# Patient Record
Sex: MALE | Race: WHITE | NOT HISPANIC OR LATINO | URBAN - METROPOLITAN AREA
[De-identification: names, ages, dates, MRNs, and addresses within clinical notes are randomized per-mention and may not be internally consistent; named-entity substitution may affect disease eponyms.]

---

## 2024-10-10 ENCOUNTER — TELEPHONE (OUTPATIENT)
Dept: UROLOGY | Facility: CLINIC | Age: 24
End: 2024-10-10
Payer: COMMERCIAL

## 2024-10-10 DIAGNOSIS — Z00.00 ENCOUNTER FOR MEDICAL EXAMINATION TO ESTABLISH CARE: Primary | ICD-10-CM

## 2024-10-10 NOTE — PROGRESS NOTES
Spoke with patient in regard to urology apt. Informed him a  focused physical exam, early morning T and discussion of treatment options for ED would be the focus of the urology apt today. Discussed the need for a complete history and physical as well as recent routine blood work prior to initiation of ED medications. He would like a referral to internal medicine to establish care and will follow up with urology once established with PCP if needed. Referral placed to internal medicine.

## 2024-10-10 NOTE — TELEPHONE ENCOUNTER
Left a VM informing the patient that I do not prescribe ED medication without patient being established with PCP. Stated that he can keep the apt to discuss ED but no medications would be prescribed at this time. Left call back number.

## 2024-10-15 ENCOUNTER — OFFICE VISIT (OUTPATIENT)
Dept: INTERNAL MEDICINE | Facility: CLINIC | Age: 24
End: 2024-10-15
Payer: COMMERCIAL

## 2024-10-15 ENCOUNTER — LAB VISIT (OUTPATIENT)
Dept: LAB | Facility: HOSPITAL | Age: 24
End: 2024-10-15
Payer: COMMERCIAL

## 2024-10-15 VITALS
RESPIRATION RATE: 15 BRPM | OXYGEN SATURATION: 98 % | DIASTOLIC BLOOD PRESSURE: 80 MMHG | WEIGHT: 148.81 LBS | SYSTOLIC BLOOD PRESSURE: 115 MMHG

## 2024-10-15 DIAGNOSIS — R68.82 LOW LIBIDO: ICD-10-CM

## 2024-10-15 DIAGNOSIS — F41.9 ANXIETY: Primary | ICD-10-CM

## 2024-10-15 DIAGNOSIS — N52.9 ERECTILE DISORDER: ICD-10-CM

## 2024-10-15 DIAGNOSIS — Z00.00 ENCOUNTER FOR MEDICAL EXAMINATION TO ESTABLISH CARE: ICD-10-CM

## 2024-10-15 DIAGNOSIS — Z86.39 HISTORY OF VITAMIN D DEFICIENCY: ICD-10-CM

## 2024-10-15 LAB
BASOPHILS # BLD AUTO: 0.03 K/UL (ref 0–0.2)
BASOPHILS NFR BLD: 0.5 % (ref 0–1.9)
CHOLEST SERPL-MCNC: 157 MG/DL (ref 120–199)
CHOLEST/HDLC SERPL: 2.5 {RATIO} (ref 2–5)
DIFFERENTIAL METHOD BLD: ABNORMAL
EOSINOPHIL # BLD AUTO: 0.3 K/UL (ref 0–0.5)
EOSINOPHIL NFR BLD: 5.3 % (ref 0–8)
ERYTHROCYTE [DISTWIDTH] IN BLOOD BY AUTOMATED COUNT: 11.9 % (ref 11.5–14.5)
HCT VFR BLD AUTO: 46 % (ref 40–54)
HDLC SERPL-MCNC: 62 MG/DL (ref 40–75)
HDLC SERPL: 39.5 % (ref 20–50)
HGB BLD-MCNC: 15.9 G/DL (ref 14–18)
IMM GRANULOCYTES # BLD AUTO: 0.01 K/UL (ref 0–0.04)
IMM GRANULOCYTES NFR BLD AUTO: 0.2 % (ref 0–0.5)
LDLC SERPL CALC-MCNC: 83.6 MG/DL (ref 63–159)
LYMPHOCYTES # BLD AUTO: 1.6 K/UL (ref 1–4.8)
LYMPHOCYTES NFR BLD: 26.1 % (ref 18–48)
MCH RBC QN AUTO: 31.6 PG (ref 27–31)
MCHC RBC AUTO-ENTMCNC: 34.6 G/DL (ref 32–36)
MCV RBC AUTO: 92 FL (ref 82–98)
MONOCYTES # BLD AUTO: 0.5 K/UL (ref 0.3–1)
MONOCYTES NFR BLD: 7.2 % (ref 4–15)
NEUTROPHILS # BLD AUTO: 3.8 K/UL (ref 1.8–7.7)
NEUTROPHILS NFR BLD: 60.7 % (ref 38–73)
NONHDLC SERPL-MCNC: 95 MG/DL
NRBC BLD-RTO: 0 /100 WBC
PLATELET # BLD AUTO: 295 K/UL (ref 150–450)
PMV BLD AUTO: 11.5 FL (ref 9.2–12.9)
RBC # BLD AUTO: 5.03 M/UL (ref 4.6–6.2)
TRIGL SERPL-MCNC: 57 MG/DL (ref 30–150)
TSH SERPL DL<=0.005 MIU/L-ACNC: 1.8 UIU/ML (ref 0.4–4)
WBC # BLD AUTO: 6.24 K/UL (ref 3.9–12.7)

## 2024-10-15 PROCEDURE — 80061 LIPID PANEL: CPT

## 2024-10-15 PROCEDURE — 99999 PR PBB SHADOW E&M-EST. PATIENT-LVL III: CPT | Mod: PBBFAC,,,

## 2024-10-15 PROCEDURE — 84403 ASSAY OF TOTAL TESTOSTERONE: CPT

## 2024-10-15 PROCEDURE — 82040 ASSAY OF SERUM ALBUMIN: CPT

## 2024-10-15 PROCEDURE — 84443 ASSAY THYROID STIM HORMONE: CPT

## 2024-10-15 PROCEDURE — 85025 COMPLETE CBC W/AUTO DIFF WBC: CPT

## 2024-10-15 PROCEDURE — 82652 VIT D 1 25-DIHYDROXY: CPT

## 2024-10-15 PROCEDURE — 36415 COLL VENOUS BLD VENIPUNCTURE: CPT

## 2024-10-15 NOTE — PROGRESS NOTES
[facility]  RESIDENT CONTINUITY CLINIC NOTE    Name: Dong Ennis  : 2000  MRN: [id number]  Date of Service: 10/15/2024   PCP: Viri, Primary Doctor          HPI:           Dong Ennis is a 24 y.o. male with history of social anxiety on escitalopram and propranolol PRN who presents to clinic for erectile dysfunction.  This has been happening for the past year, reports going to a clinic where his testosterone was checked, was told it was low and was started on tadalafil.  Since then he sought psychology held and was started on escitalopram which has been taking for the last 5 months, he reports symptoms have actually improved, denies worsening ED.  Reports recently starting taking propranolol as PRN while in social situations, rarely takes, not sure if he has taken before intercourse to reduced performance anxiety which could be worsening his symptoms.  Patient also refers did despite being on tadalafil he is not able to achieve a full erection, even during masturbation.  He describes his erection in between a 2 and a 3 in the ED hardness scale.    Patient does report that he has not noticed hypogonadism, testicular ultrasound done at [place]  show left small hydrocele, which is asymptomatic.    Bess 7 and PHQ-9 done in office, per bess symptoms are controlled, PHQ-9 score of 5 showing mild.    He reports following with [facility] for mental health.    Labs from outside facilities showing unremarkable CMP negative STD workup negative HIV done early 24.    Remote history of vitamin-D deficiency not on vitamin-D supplementation currently.    Social works as a CT tech at , currently in a monogamous relationship, has had both male and female partners.  Reports alcohol consumption twice per month under social setting, rarely T HC gummies, does not smoke, does not exercise as much as he would want.        Review of systems as above; all unmentioned  systems are negative.     PE:     Vitals:    10/15/24 0913   BP: 115/80   Resp: 15   SpO2: 98%   Weight: 67.5 kg (148 lb 13 oz)     There is no height or weight on file to calculate BMI.    Physical exam: Oropharynx is clear.  No adenopathy.  Lungs CTA.  No murmurs, gallops, or rubs.  No abdominal tenderness.  No RUBY.        Other pertinent data from chart that formed part of my MDM:         No past medical history on file.  No current outpatient medications  No current facility-administered medications for this visit.     No current outpatient medications on file.   No past medical history on file.  No past surgical history on file.  No family history on file.  Social History     Socioeconomic History    Marital status: Single     Social Drivers of Health     Financial Resource Strain: Low Risk  (10/14/2024)    Overall Financial Resource Strain (CARDIA)     Difficulty of Paying Living Expenses: Not very hard   Food Insecurity: No Food Insecurity (10/14/2024)    Hunger Vital Sign     Worried About Running Out of Food in the Last Year: Never true     Ran Out of Food in the Last Year: Never true   Physical Activity: Unknown (10/14/2024)    Exercise Vital Sign     Days of Exercise per Week: 2 days   Stress: Stress Concern Present (10/14/2024)    [employer] [place] [employer] - Occupational Stress Questionnaire     Feeling of Stress : To some extent   Housing Stability: Unknown (10/14/2024)    Housing Stability Vital Sign     Unable to Pay for Housing in the Last Year: No                       Assessment and Plan:       Anxiety  Patient with a history of anxiety recently started escitalopram 5 mg being treated by [facility], also recent start of propranolol 10 mg PRN for social settings, bess 7 done in office with a score of 3 meaning that symptoms are controlled, patient does report symptoms to have considerably improved and actually playing less overall in his erectile dysfunction.  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    -     TSH; Future; Expected date: 10/15/2024  -     TESTOSTERONE PANEL; Future; Expected date: 10/15/2024    History of vitamin D deficiency    -     Calcitriol; Future; Expected date: 10/15/2024            RTC in 3 months      Discussed with Dr. Marilyn Marino, further recommendations as per attending addendum. Please feel free to contact me with any questions or concerns.    Yves Shah MD  Internal Medicine , PGY-3    [facility]  [address] [place] [id number]  [phone]

## 2024-10-18 LAB — 1,25(OH)2D3 SERPL-MCNC: 55 PG/ML (ref 20–79)

## 2024-10-23 LAB
ALBUMIN SERPL-MCNC: 5 G/DL (ref 3.6–5.1)
SHBG SERPL-SCNC: 55 NMOL/L (ref 10–50)
TESTOST FREE SERPL-MCNC: 67.2 PG/ML (ref 46–224)
TESTOST SERPL-MCNC: 752 NG/DL (ref 250–1100)
TESTOSTERONE.FREE+WB SERPL-MCNC: 152.7 NG/DL

## 2024-10-31 ENCOUNTER — PATIENT MESSAGE (OUTPATIENT)
Dept: INTERNAL MEDICINE | Facility: CLINIC | Age: 24
End: 2024-10-31
Payer: COMMERCIAL

## 2024-11-01 PROBLEM — F41.0 PANIC DISORDER: Status: ACTIVE | Noted: 2024-11-01

## 2024-11-01 PROBLEM — F41.1 GENERALIZED ANXIETY DISORDER: Status: ACTIVE | Noted: 2024-11-01
